# Patient Record
Sex: MALE
[De-identification: names, ages, dates, MRNs, and addresses within clinical notes are randomized per-mention and may not be internally consistent; named-entity substitution may affect disease eponyms.]

---

## 2018-07-02 ENCOUNTER — HOSPITAL ENCOUNTER (EMERGENCY)
Dept: HOSPITAL 97 - ER | Age: 78
Discharge: HOME | End: 2018-07-02
Payer: COMMERCIAL

## 2018-07-02 DIAGNOSIS — Z79.82: ICD-10-CM

## 2018-07-02 DIAGNOSIS — I50.9: ICD-10-CM

## 2018-07-02 DIAGNOSIS — I10: Primary | ICD-10-CM

## 2018-07-02 DIAGNOSIS — E78.5: ICD-10-CM

## 2018-07-02 DIAGNOSIS — Z95.1: ICD-10-CM

## 2018-07-02 DIAGNOSIS — E11.9: ICD-10-CM

## 2018-07-02 DIAGNOSIS — Z79.01: ICD-10-CM

## 2018-07-02 LAB
ALBUMIN SERPL BCP-MCNC: 3.7 G/DL (ref 3.4–5)
ALP SERPL-CCNC: 78 U/L (ref 45–117)
ALT SERPL W P-5'-P-CCNC: 21 U/L (ref 12–78)
AST SERPL W P-5'-P-CCNC: 14 U/L (ref 15–37)
BUN BLD-MCNC: 15 MG/DL (ref 7–18)
GLUCOSE SERPLBLD-MCNC: 112 MG/DL (ref 74–106)
HCT VFR BLD CALC: 37.3 % (ref 39.6–49)
INR BLD: 1.05
LYMPHOCYTES # SPEC AUTO: 2.5 K/UL (ref 0.7–4.9)
MAGNESIUM SERPL-MCNC: 2.3 MG/DL (ref 1.8–2.4)
MCH RBC QN AUTO: 31.6 PG (ref 27–35)
MCV RBC: 96.5 FL (ref 80–100)
NT-PROBNP SERPL-MCNC: 725 PG/ML (ref ?–450)
PMV BLD: 7.9 FL (ref 7.6–11.3)
POTASSIUM SERPL-SCNC: 3.4 MMOL/L (ref 3.5–5.1)
RBC # BLD: 3.87 M/UL (ref 4.33–5.43)

## 2018-07-02 PROCEDURE — 71045 X-RAY EXAM CHEST 1 VIEW: CPT

## 2018-07-02 PROCEDURE — 83735 ASSAY OF MAGNESIUM: CPT

## 2018-07-02 PROCEDURE — 80076 HEPATIC FUNCTION PANEL: CPT

## 2018-07-02 PROCEDURE — 80048 BASIC METABOLIC PNL TOTAL CA: CPT

## 2018-07-02 PROCEDURE — 93005 ELECTROCARDIOGRAM TRACING: CPT

## 2018-07-02 PROCEDURE — 85025 COMPLETE CBC W/AUTO DIFF WBC: CPT

## 2018-07-02 PROCEDURE — 81003 URINALYSIS AUTO W/O SCOPE: CPT

## 2018-07-02 PROCEDURE — 36415 COLL VENOUS BLD VENIPUNCTURE: CPT

## 2018-07-02 PROCEDURE — 83880 ASSAY OF NATRIURETIC PEPTIDE: CPT

## 2018-07-02 PROCEDURE — 96374 THER/PROPH/DIAG INJ IV PUSH: CPT

## 2018-07-02 PROCEDURE — 99285 EMERGENCY DEPT VISIT HI MDM: CPT

## 2018-07-02 PROCEDURE — 85610 PROTHROMBIN TIME: CPT

## 2018-07-02 NOTE — EKG
Test Date:    2018-07-02               Test Time:    16:14:57

Technician:   MERCEDES                                     

                                                     

MEASUREMENT RESULTS:                                       

Intervals:                                           

Rate:         70                                     

NJ:           270                                    

QRSD:         92                                     

QT:           432                                    

QTc:          466                                    

Axis:                                                

P:            21                                     

NJ:           270                                    

QRS:          -4                                     

T:            29                                     

                                                     

INTERPRETIVE STATEMENTS:                                       

                                                     

Sinus rhythm with sinus arrhythmia with 1st degree AV block

Otherwise normal ECG

No previous ECG available for comparison



Electronically Signed On 07-02-18 22:12:31 CDT by Jeff Lambert

## 2018-07-02 NOTE — EDPHYS
Physician Documentation                                                                           

 [facility]                                                                

Name: Josue Chinchilla                                                                            

Age: 78 yrs                                                                                       

Sex: Male                                                                                         

: 1940                                                                                   

MRN: [id number]                                                                                   

Arrival Date: 2018                                                                          

Time: 15:42                                                                                       

Account#: [id number]                                                                            

Bed 5                                                                                             

Private MD:                                                                                       

ED Physician Pietro Kearney                                                                       

HPI:                                                                                              

                                                                                             

16:21 This 78 yrs old  Male presents to ER via Ambulatory with complaints of High     jr8 

      Blood Pressure.                                                                             

16:21 Onset: The symptoms/episode began/occurred at an unknown time. Associated signs and     jr8 

      symptoms: The patient has no apparent associated signs or symptoms. Severity of             

      symptoms: At its worst the blood pressure was moderate, in the emergency department the     

      blood pressure is improved. history of HTN. The patient has been recently seen by a         

      physician:. Patient stated that he has had slight wheeze in chest for the past couple       

      of days. Feels as if he has cold. Had gone to PCP office today and noticed he had           

      elevated BP and sent him to ED for further evaluation. Denies CP or shortness of breath     

      currently. Stated that he has been out of his carvedilol and lasix for about 1 week .       

                                                                                                  

Historical:                                                                                       

- Allergies:                                                                                      

15:47 No Known Allergies;                                                                     hj  

- Home Meds:                                                                                      

15:47 amlodipine 10 mg tab 1 tab once daily [Active]; Eliquis 2.5 mg oral tab 1 tab 2 times   hj  

      per day [Active]; aspirin 81 mg Oral TbEC 1 tab once daily [Active]; atorvastatin 40 mg     

      oral tab 1 tab once daily [Active]; carvedilol 25 mg oral tab 1 tab 2 times per day         

      [Active]; ferrous sulfate 325 mg (65 mg iron) Oral tab [Active]; metformin 500 mg Oral      

      tab 1 tab 2 times per day [Active]; hydralazine 25 mg Oral tab 1 tab 4 times per day        

      [Active]; losartan-hydrochlorothiazide 100-12.5 mg oral tab 1 tab once daily [Active];      

      Lasix 20 mg Oral tab 1 tab once daily [Active];                                             

- PMHx:                                                                                           

15:47 Hypertension; Hyperlipidemia; Diabetes - NIDDM; CHF;                                    hj  

- PSHx:                                                                                           

15:47 CABG;                                                                                   hj  

                                                                                                  

- Immunization history:: Adult Immunizations up to date.                                          

- Social history:: Smoking status: Patient/guardian denies using tobacco,                         

  Patient/guardian denies using alcohol.                                                          

- Ebola Screening: : Patient negative for fever greater than or equal to 101.5 degrees            

  Fahrenheit, and additional compatible Ebola Virus Disease symptoms Patient denies               

  exposure to infectious person Patient denies travel to an Ebola-affected area in the            

  21 days before illness onset.                                                                   

                                                                                                  

                                                                                                  

ROS:                                                                                              

16:21 Eyes: Negative for injury, pain, redness, and discharge, ENT: Negative for injury,      jr8 

      pain, and discharge, Neck: Negative for injury, pain, and swelling, Cardiovascular:         

      Negative for chest pain, palpitations, and edema, Abdomen/GI: Negative for abdominal        

      pain, nausea, vomiting, diarrhea, and constipation, Back: Negative for injury and pain,     

      MS/Extremity: Negative for injury and deformity, Skin: Negative for injury, rash, and       

      discoloration, Neuro: Negative for headache, weakness, numbness, tingling, and seizure.     

16:21 Respiratory: Positive for wheezing, Negative for cough, dyspnea on exertion,                

      hemoptysis, orthopnea, shortness of breath.                                                 

                                                                                                  

Exam:                                                                                             

16:21 Eyes:  Pupils equal round and reactive to light, extra-ocular motions intact.  Lids and jr8 

      lashes normal.  Conjunctiva and sclera are non-icteric and not injected.  Cornea within     

      normal limits.  Periorbital areas with no swelling, redness, or edema. ENT:  Nares          

      patent. No nasal discharge, no septal abnormalities noted.  Tympanic membranes are          

      normal and external auditory canals are clear.  Oropharynx with no redness, swelling,       

      or masses, exudates, or evidence of obstruction, uvula midline.  Mucous membranes           

      moist. Neck:  Trachea midline, no thyromegaly or masses palpated, and no cervical           

      lymphadenopathy.  Supple, full range of motion without nuchal rigidity, or vertebral        

      point tenderness.  No Meningismus. Abdomen/GI:  Soft, non-tender, with normal bowel         

      sounds.  No distension or tympany.  No guarding or rebound.  No evidence of tenderness      

      throughout. Back:  No spinal tenderness.  No costovertebral tenderness.  Full range of      

      motion. Skin:  Warm, dry with normal turgor.  Normal color with no rashes, no lesions,      

      and no evidence of cellulitis. MS/ Extremity:  Pulses equal, no cyanosis.                   

      Neurovascular intact.  Full, normal range of motion. Neuro:  Awake and alert, GCS 15,       

      oriented to person, place, time, and situation.  Cranial nerves II-XII grossly intact.      

      Motor strength 5/5 in all extremities.  Sensory grossly intact.  Cerebellar exam            

      normal.  Normal gait.                                                                       

16:21 Cardiovascular: Rate: normal, Rhythm: regular, Pulses: Pulses are 2+ in right radial        

      artery and left radial artery. Heart sounds: normal, normal S1and S2, no S3 or S4, no       

      murmur, no rub, no gallop, Edema: 2+ edema to level of left midcalf, left ankle, right      

      midcalf and right ankle, JVD: is not appreciated.                                           

16:21 Respiratory: the patient does not display signs of respiratory distress,  Respirations:     

      normal, symetrical, no use of accessory muscles, no grunting, no evidence of nasal          

      flaring, no prolonged exhalations, no pursed lip breathing, no retractions, no shallow      

      respirations, no splinting, no tachypnea, Breath sounds: wheezing: expiratory that is       

      mild, is heard diffusely.                                                                   

16:53 ECG was reviewed by the Attending Physician.                                            [facility] 

                                                                                                  

Vital Signs:                                                                                      

15:47  / 71; Pulse 72; Resp 18; Temp 98.6(O); Pulse Ox 94% on R/A; Weight 105.69 kg;    hj  

      Height 6 ft. 0 in. (182.88 cm); Pain 0/10;                                                  

17:01  / 77; Pulse 63; Resp 24; Temp 98.5; Pulse Ox 99% on Nebulizer Mask; Pain 0/10;   ch  

17:58  / 88; Pulse 71; Resp 20; Temp 98.3; Pulse Ox 96% on R/A; Pain 0/10;              ch  

15:47 Body Mass Index 31.60 (105.69 kg, 182.88 cm)                                              

                                                                                                  

MDM:                                                                                              

16:01 Patient medically screened.                                                             [facility] 

17:27 Data reviewed: vital signs, nurses notes, lab test result(s), EKG, radiologic studies,  8 

      plain films, and as a result, I will discharge patient. Data interpreted: Pulse             

      oximetry: on room air is 99 %. Interpretation: normal. Counseling: I had a detailed         

      discussion with the patient and/or guardian regarding: the historical points, exam          

      findings, and any diagnostic results supporting the discharge/admit diagnosis, lab          

      results, radiology results, the need for outpatient follow up, a family practitioner,       

      to return to the emergency department if symptoms worsen or persist or if there are any     

      questions or concerns that arise at home. Response to treatment: the patient's symptoms     

      have markedly improved after treatment. ED course: Patient still without shortness of       

      breath, chest pain, dizziness, visual disturbances, nausea, numbness, or tingling. Will     

      send home on medication to help with wheezing. Will prescribe blood pressure medication     

      and lasix for him to insure he is taking those as prescribed by his PCP .                   

                                                                                                  

                                                                                             

16:07 Order name: Basic Metabolic Panel; Complete Time: 17:24                                                                                                                              

16:07 Order name: CBC with Diff; Complete Time: 17:05                                                                                                                                      

16:07 Order name: LFT's; Complete Time: 17:24                                                                                                                                              

16:07 Order name: Magnesium; Complete Time: 17:24                                                                                                                                          

16:07 Order name: NT PRO-BNP; Complete Time: 17:24                                                                                                                                         

16:07 Order name: PT-INR; Complete Time: 17:05                                                                                                                                             

16:07 Order name: XRAY Chest (1 view); Complete Time: 16:58                                                                                                                                

16:07 Order name: EKG; Complete Time: 16:08                                                                                                                                                

16:07 Order name: Cardiac monitoring; Complete Time: 18:02                                                                                                                                 

17:32 Order name: Urine Dipstick--Ancillary (enter results); Complete Time: 17:53             1 

                                                                                             

16:07 Order name: EKG - Nurse/Tech; Complete Time: 18:02                                                                                                                                   

16:07 Order name: IV Saline Lock; Complete Time: 18:02                                                                                                                                     

16:07 Order name: Labs collected and sent; Complete Time: 18:02                                                                                                                            

16:07 Order name: O2 Per Protocol; Complete Time: 18:02                                                                                                                                    

16:07 Order name: O2 Sat Monitoring; Complete Time: 18:02                                                                                                                                  

16:07 Order name: Urine Dipstick-Ancillary (obtain specimen); Complete Time: 17:31            jr8 

                                                                                                  

EC:53 Rate is 70 beats/min. Rhythm is regular, Sinus Rhythm. OH interval is prolonged at 270  jr8 

      msec. QRS interval is normal at 92 msec. QT interval is prolonged at 466 msec. No Q         

      waves. T waves are Normal. No ST changes noted. Clinical impression: 1st degree heart       

      block. Interpreted by me. Reviewed by me.                                                   

                                                                                                  

Administered Medications:                                                                         

17:00 Drug: Albuterol 2.5 mg Route: Inhalation;                                                 

17:00 Drug: Lasix 40 mg Route: IVP; Site: right forearm;                                        

18:01 Follow up: Response: No adverse reaction; Marked relief of symptoms                     ch  

17:00 Drug: carvedilol 25 mg Route: PO;                                                       ch  

18:01 Follow up: Response: No adverse reaction; Marked relief of symptoms                     ch  

17:20 Drug: Albuterol 2.5 mg Route: Inhalation;                                               ch  

17:35 Drug: Albuterol 2.5 mg Route: Inhalation;                                                 

18:02 Follow up: Response: No adverse reaction; Marked relief of symptoms                       

                                                                                                  

                                                                                                  

Disposition:                                                                                      

21:14 Co-signature as Attending Physician, Pietro Kearney MD.                                    

                                                                                                  

Disposition:                                                                                      

18 17:29 Discharged to Home. Impression: Wheezing, Essential (primary) hypertension.        

- Condition is Stable.                                                                            

- Discharge Instructions: Acute Bronchitis, Hypertension.                                         

- Prescriptions for carvedilol 25 mg Oral tablet - take 1 tablet by ORAL route every 12           

  hours with food; 60 tablet. Norvasc 10 mg Oral Tablet - take 1 tablet by ORAL route             

  once daily; 30 tablet. Lasix 20 mg Oral Tablet - take 1 tablet by ORAL route once               

  daily; 20 tablet. Albuterol Sulfate 90 mcg/actuation - inhale 1-2 puff by INHALATION            

  route every 4-6 hours; 1 Inhaler.                                                               

- Medication Reconciliation Form, Thank You Letter, Antibiotic Education, Prescription            

  Opioid Use form.                                                                                

- Follow up: Private Physician; When: 5 - 6 days; Reason: Recheck today's complaints,             

  Continuance of care, Re-evaluation by your physician.                                           

- Problem is new.                                                                                 

- Symptoms have improved.                                                                         

                                                                                                  

                                                                                                  

                                                                                                  

Signatures:                                                                                       

Dispatcher [employer]                           EDMS                                                 

Adri Bauman, RN                  RN   Aries Barahona PA PA   jr8                                                  

Renée Yin Henry RN                      RN   Pietro Jones MD MD                                                      

                                                                                                  

Corrections: (The following items were deleted from the chart)                                    

16:13 16:08 TROPONIN (EMERG DEPT USE ONLY)+C.LAB.BRZ ordered. EDMS                            EDMS

16:24 16:21 Patient stated that he has had slight wheeze in chest for the past couple of      jr8 

      days. Feels as if he has cold. Had gone to PCP office today and noticed he had elevated     

      BP and sent him to ED for further evaluation. Denies CP or shortness of breath              

      currently . jr8                                                                             

18:00 17:29 2018 17:29 Discharged to Home. Impression: Wheezing; Essential (primary)    ch  

      hypertension. Condition is Stable. Forms are Medication Reconciliation Form, Thank You      

      Letter, Antibiotic Education, Prescription Opioid Use. Follow up: Private Physician;        

      When: 5 - 6 days; Reason: Recheck today's complaints, Continuance of care,                  

      Re-evaluation by your physician. Problem is new. Symptoms have improved. 8                

18:38 18:00 2018 17:29 Discharged to Home. Impression: Wheezing; Essential (primary)    ag  

      hypertension. Condition is Stable. Discharge Instructions: Acute Bronchitis,                

      Hypertension. Prescriptions for carvedilol 25 mg Oral tablet - take 1 tablet by ORAL        

      route every 12 hours with food; 60 tablet, Norvasc 10 mg Oral Tablet - take 1 tablet by     

      ORAL route once daily; 30 tablet, Lasix 20 mg Oral Tablet - take 1 tablet by ORAL route     

      once daily; 20 tablet, Albuterol Sulfate 90 mcg/actuation - inhale 1-2 puff by              

      INHALATION route every 4-6 hours; 1 Inhaler. and Forms are Medication Reconciliation        

      Form, Thank You Letter, Antibiotic Education, Prescription Opioid Use. Follow up:           

      Private Physician; When: 5 - 6 days; Reason: Recheck today's complaints, Continuance of     

      care, Re-evaluation by your physician. Problem is new. Symptoms have improved. ch           

                                                                                                  

**************************************************************************************************

## 2018-07-02 NOTE — RAD REPORT
EXAM DESCRIPTION:  RAD - Chest Single View - 7/2/2018 4:37 pm

 

CLINICAL HISTORY:  Dyspnea, hypertension

 

COMPARISON:  May 2017

 

TECHNIQUE:  AP portable chest image was obtained 1623 hours .

 

FINDINGS:  No peripheral mass or consolidation. Lung markings are similar to the comparison. Cardiac 
silhouette has enlarged slightly. No vascular engorgement or acute findings of failure. Trachea is mi
dline. Sternotomy wires are in place. No measurable pleural effusion and no pneumothorax. No gross sina
ny abnormality seen. No acute aortic findings suspected.

 

IMPRESSION:  No acute cardiopulmonary process.

 

Mild cardiomegaly is present increased slightly from comparison. Acute failure or volume overload not
 suspected.

## 2018-07-02 NOTE — ER
Nurse's Notes                                                                                     

 [facility]                                                                

Name: Josue Chinchilla                                                                            

Age: 78 yrs                                                                                       

Sex: Male                                                                                         

: 1940                                                                                   

MRN: [id number]                                                                                   

Arrival Date: 2018                                                                          

Time: 15:42                                                                                       

Account#: [id number]                                                                            

Bed 5                                                                                             

Private MD:                                                                                       

Diagnosis: Wheezing;Essential (primary) hypertension                                              

                                                                                                  

Presentation:                                                                                     

                                                                                             

15:42 Presenting complaint: Patient states: went to my PCP today, at the clinic my BP was     hj  

      over 200's, when i went home, my BP was at 175's; denies headache; denies chest pain;       

      was advised to go the the ED for eval;. Transition of care: patient was not received        

      from another setting of care. Onset of symptoms was 2018. Risk Assessment: Do      

      you want to hurt yourself or someone else? Patient reports no desire to harm self or        

      others. Initial Sepsis Screen: Does the patient meet any 2 criteria? No. Patient's          

      initial sepsis screen is negative. Does the patient have a suspected source of              

      infection? No. Patient's initial sepsis screen is negative. Care prior to arrival: None.    

15:42 Method Of Arrival: Ambulatory                                                             

15:42 Acuity: MIRNA 3                                                                           hj  

                                                                                                  

Triage Assessment:                                                                                

15:47 General: Appears in no apparent distress. uncomfortable, Behavior is calm, cooperative, hj  

      appropriate for age. Pain: Denies pain.                                                     

                                                                                                  

Historical:                                                                                       

- Allergies:                                                                                      

15:47 No Known Allergies;                                                                     hj  

- Home Meds:                                                                                      

15:47 amlodipine 10 mg tab 1 tab once daily [Active]; Eliquis 2.5 mg oral tab 1 tab 2 times   hj  

      per day [Active]; aspirin 81 mg Oral TbEC 1 tab once daily [Active]; atorvastatin 40 mg     

      oral tab 1 tab once daily [Active]; carvedilol 25 mg oral tab 1 tab 2 times per day         

      [Active]; ferrous sulfate 325 mg (65 mg iron) Oral tab [Active]; metformin 500 mg Oral      

      tab 1 tab 2 times per day [Active]; hydralazine 25 mg Oral tab 1 tab 4 times per day        

      [Active]; losartan-hydrochlorothiazide 100-12.5 mg oral tab 1 tab once daily [Active];      

      Lasix 20 mg Oral tab 1 tab once daily [Active];                                             

- PMHx:                                                                                           

15:47 Hypertension; Hyperlipidemia; Diabetes - NIDDM; CHF;                                    hj  

- PSHx:                                                                                           

15:47 CABG;                                                                                   hj  

                                                                                                  

- Immunization history:: Adult Immunizations up to date.                                          

- Social history:: Smoking status: Patient/guardian denies using tobacco,                         

  Patient/guardian denies using alcohol.                                                          

- Ebola Screening: : Patient negative for fever greater than or equal to 101.5 degrees            

  Fahrenheit, and additional compatible Ebola Virus Disease symptoms Patient denies               

  exposure to infectious person Patient denies travel to an Ebola-affected area in the            

  21 days before illness onset.                                                                   

                                                                                                  

                                                                                                  

Screening:                                                                                        

15:48 Abuse screen: Denies threats or abuse. Denies injuries from another. Nutritional        hj  

      screening: No deficits noted. Tuberculosis screening: No symptoms or risk factors           

      identified. Fall Risk None identified.                                                      

                                                                                                  

Assessment:                                                                                       

15:59 General: Appears in no apparent distress. comfortable, Behavior is calm, cooperative,   ch  

      appropriate for age. Pain: Denies pain. Neuro: No deficits noted. Cardiovascular: Heart     

      tones S1 S2 present Capillary refill < 3 seconds in bilateral fingers toes Clubbing of      

      nail beds is present JVD is present Patient's skin is warm and dry. Pulses are all          

      present. Edema is 2+ to left midcalf, left ankle, right midcalf and right ankle pitting     

      to left midcalf, left ankle, right midcalf and right ankle. Respiratory: Reports cough      

      that is productive, Airway is patent Respiratory effort is even, labored, Breath sounds     

      are coarse Breath sounds with crackles Breath sounds with wheezes bilaterally. GI: No       

      signs and/or symptoms were reported involving the gastrointestinal system. Derm: Skin       

      is pink, warm \T\ dry. Musculoskeletal: No signs and/or symptoms reported regarding the     

      musculoskeletal system.                                                                     

16:59 Reassessment: Patient appears in no apparent distress at this time. No changes from       

      previously documented assessment. Patient and/or family updated on plan of care and         

      expected duration. Pain level reassessed.                                                   

17:58 Reassessment: Patient appears in no apparent distress at this time. Patient and/or        

      family updated on plan of care and expected duration. Pain level reassessed. Patient is     

      alert, oriented x 3, equal unlabored respirations, skin warm/dry/pink. Patient denies       

      pain at this time. Patient states feeling better. Patient states symptoms have improved.    

                                                                                                  

Vital Signs:                                                                                      

15:47  / 71; Pulse 72; Resp 18; Temp 98.6(O); Pulse Ox 94% on R/A; Weight 105.69 kg;    hj  

      Height 6 ft. 0 in. (182.88 cm); Pain 0/10;                                                  

17:01  / 77; Pulse 63; Resp 24; Temp 98.5; Pulse Ox 99% on Nebulizer Mask; Pain 0/10;   ch  

17:58  / 88; Pulse 71; Resp 20; Temp 98.3; Pulse Ox 96% on R/A; Pain 0/10;              ch  

15:47 Body Mass Index 31.60 (105.69 kg, 182.88 cm)                                            hj  

                                                                                                  

ED Course:                                                                                        

15:42 Patient arrived in ED.                                                                  mr  

15:44 Adri Bauman, RN is Primary Nurse.                                                ch  

15:44 Triage completed.                                                                       hj  

15:48 Arm band placed on right wrist.                                                         hj  

15:48 Patient has correct armband on for positive identification. Placed in gown. Bed in low  hj  

      position. Call light in reach. Side rails up X 1. Adult w/ patient.                         

15:50 Cardiac monitor on. Pulse ox on. NIBP on.                                               ch  

15:50 Warm blanket given.                                                                     ch  

15:51 Pietro Kearney MD is Attending Physician.                                              gs  

15:58 Aries Rosenbaum PA is PHCP.                                                               [id number] 

16:20 EKG done, by EKG tech. reviewed by Aries ISRAEL.                                      [id number] 

16:33 X-ray completed. Portable x-ray completed in exam room. Patient tolerated procedure     kp1 

      well.                                                                                       

16:35 Inserted saline lock: 18 gauge in right forearm, using aseptic technique. Blood         ch  

      collected.                                                                                  

16:36 XRAY Chest (1 view) In Process Unspecified.                                             EDMS

16:59 Lab(s) recollected, by ED staff, sent to lab.                                           ch  

18:00 No provider procedures requiring assistance completed. IV discontinued, intact,         ch  

      bleeding controlled, No redness/swelling at site. Pressure dressing applied.                

18:36 Primary Nurse role handed off by Adri Bauman, RN                                 ag  

                                                                                                  

Administered Medications:                                                                         

17:00 Drug: Albuterol 2.5 mg Route: Inhalation;                                               ch  

17:00 Drug: Lasix 40 mg Route: IVP; Site: right forearm;                                      ch  

18:01 Follow up: Response: No adverse reaction; Marked relief of symptoms                     ch  

17:00 Drug: carvedilol 25 mg Route: PO;                                                       ch  

18:01 Follow up: Response: No adverse reaction; Marked relief of symptoms                     ch  

17:20 Drug: Albuterol 2.5 mg Route: Inhalation;                                                 

17:35 Drug: Albuterol 2.5 mg Route: Inhalation;                                                 

18:02 Follow up: Response: No adverse reaction; Marked relief of symptoms                       

                                                                                                  

                                                                                                  

Outcome:                                                                                          

17:29 Discharge ordered by MD. triplett 

18:00 Discharged to home ambulatory, with family.                                               

18:00 Condition: improved                                                                         

18:00 Discharge instructions given to patient, family, Instructed on discharge instructions,      

      follow up and referral plans. no drinking with medication, medication usage,                

      Demonstrated understanding of instructions, follow-up care, medications, Prescriptions      

      given X 4.                                                                                  

18:00 Patient left the ED.                                                                      

18:38 Patient left the ED.                                                                    ag  

                                                                                                  

Signatures:                                                                                       

Dispatcher [employer]                           EDMS                                                 

Adri Bauman, TESSA                  RN   Namrata Pozo                                mr                                                   

Aries Rosenbaum PA PA [id number] Gallardo, Ana                                                                                   

Fish Brown RN                      RN                                                      

Jaqueline Bazan                                 1                                                  

Pietro Kearney MD MD gs Montes, Shakira                              3                                                  

                                                                                                  

**************************************************************************************************